# Patient Record
Sex: MALE | Race: WHITE | Employment: UNEMPLOYED | URBAN - NONMETROPOLITAN AREA
[De-identification: names, ages, dates, MRNs, and addresses within clinical notes are randomized per-mention and may not be internally consistent; named-entity substitution may affect disease eponyms.]

---

## 2020-02-28 ENCOUNTER — OFFICE VISIT (OUTPATIENT)
Dept: FAMILY MEDICINE CLINIC | Age: 35
End: 2020-02-28
Payer: MEDICAID

## 2020-02-28 VITALS
SYSTOLIC BLOOD PRESSURE: 138 MMHG | HEIGHT: 72 IN | BODY MASS INDEX: 27.63 KG/M2 | WEIGHT: 204 LBS | HEART RATE: 84 BPM | DIASTOLIC BLOOD PRESSURE: 72 MMHG | TEMPERATURE: 98.6 F | RESPIRATION RATE: 19 BRPM | OXYGEN SATURATION: 99 %

## 2020-02-28 DIAGNOSIS — Z11.4 SCREENING FOR HIV (HUMAN IMMUNODEFICIENCY VIRUS): ICD-10-CM

## 2020-02-28 DIAGNOSIS — Z11.3 SCREENING EXAMINATION FOR STD (SEXUALLY TRANSMITTED DISEASE): ICD-10-CM

## 2020-02-28 DIAGNOSIS — F19.91 HX OF INTRAVENOUS DRUG USE, IN REMISSION: ICD-10-CM

## 2020-02-28 DIAGNOSIS — E66.3 OVERWEIGHT (BMI 25.0-29.9): ICD-10-CM

## 2020-02-28 PROBLEM — F17.200 TOBACCO DEPENDENCE: Status: ACTIVE | Noted: 2020-02-28

## 2020-02-28 LAB
ALBUMIN SERPL-MCNC: 5 G/DL (ref 3.5–5.2)
ALP BLD-CCNC: 64 U/L (ref 40–130)
ALT SERPL-CCNC: 38 U/L (ref 5–41)
ANION GAP SERPL CALCULATED.3IONS-SCNC: 12 MMOL/L (ref 7–19)
AST SERPL-CCNC: 30 U/L (ref 5–40)
BASOPHILS ABSOLUTE: 0.1 K/UL (ref 0–0.2)
BASOPHILS RELATIVE PERCENT: 0.9 % (ref 0–1)
BILIRUB SERPL-MCNC: 0.5 MG/DL (ref 0.2–1.2)
BILIRUBIN URINE: NEGATIVE
BLOOD, URINE: NEGATIVE
BUN BLDV-MCNC: 20 MG/DL (ref 6–20)
CALCIUM SERPL-MCNC: 9.6 MG/DL (ref 8.6–10)
CHLORIDE BLD-SCNC: 102 MMOL/L (ref 98–111)
CHOLESTEROL, TOTAL: 147 MG/DL (ref 160–199)
CLARITY: CLEAR
CO2: 28 MMOL/L (ref 22–29)
COLOR: YELLOW
CREAT SERPL-MCNC: 0.8 MG/DL (ref 0.5–1.2)
EOSINOPHILS ABSOLUTE: 0.1 K/UL (ref 0–0.6)
EOSINOPHILS RELATIVE PERCENT: 2 % (ref 0–5)
GFR NON-AFRICAN AMERICAN: >60
GLUCOSE BLD-MCNC: 82 MG/DL (ref 74–109)
GLUCOSE URINE: NEGATIVE MG/DL
HAV IGM SER IA-ACNC: ABNORMAL
HCT VFR BLD CALC: 49.8 % (ref 42–52)
HDLC SERPL-MCNC: 52 MG/DL (ref 55–121)
HEMOGLOBIN: 16.1 G/DL (ref 14–18)
HEPATITIS B CORE IGM ANTIBODY: ABNORMAL
HEPATITIS B SURFACE ANTIGEN INTERPRETATION: ABNORMAL
HEPATITIS C ANTIBODY INTERPRETATION: REACTIVE
HIV-1 P24 AG: NORMAL
IMMATURE GRANULOCYTES #: 0 K/UL
KETONES, URINE: NEGATIVE MG/DL
LDL CHOLESTEROL CALCULATED: 83 MG/DL
LEUKOCYTE ESTERASE, URINE: NEGATIVE
LYMPHOCYTES ABSOLUTE: 1.9 K/UL (ref 1.1–4.5)
LYMPHOCYTES RELATIVE PERCENT: 32 % (ref 20–40)
MCH RBC QN AUTO: 30.8 PG (ref 27–31)
MCHC RBC AUTO-ENTMCNC: 32.3 G/DL (ref 33–37)
MCV RBC AUTO: 95.2 FL (ref 80–94)
MONOCYTES ABSOLUTE: 0.5 K/UL (ref 0–0.9)
MONOCYTES RELATIVE PERCENT: 9.2 % (ref 0–10)
NEUTROPHILS ABSOLUTE: 3.3 K/UL (ref 1.5–7.5)
NEUTROPHILS RELATIVE PERCENT: 55.6 % (ref 50–65)
NITRITE, URINE: NEGATIVE
PDW BLD-RTO: 13.2 % (ref 11.5–14.5)
PH UA: 7 (ref 5–8)
PLATELET # BLD: 314 K/UL (ref 130–400)
PMV BLD AUTO: 9.6 FL (ref 9.4–12.4)
POTASSIUM SERPL-SCNC: 4.7 MMOL/L (ref 3.5–5)
PROTEIN UA: NEGATIVE MG/DL
RAPID HIV 1&2: NORMAL
RBC # BLD: 5.23 M/UL (ref 4.7–6.1)
SODIUM BLD-SCNC: 142 MMOL/L (ref 136–145)
SPECIFIC GRAVITY UA: 1.01 (ref 1–1.03)
TOTAL PROTEIN: 7.5 G/DL (ref 6.6–8.7)
TRIGL SERPL-MCNC: 58 MG/DL (ref 0–149)
URINE REFLEX TO CULTURE: NORMAL
UROBILINOGEN, URINE: 0.2 E.U./DL
WBC # BLD: 5.9 K/UL (ref 4.8–10.8)

## 2020-02-28 PROCEDURE — 99204 OFFICE O/P NEW MOD 45 MIN: CPT | Performed by: FAMILY MEDICINE

## 2020-02-28 SDOH — HEALTH STABILITY: MENTAL HEALTH: HOW OFTEN DO YOU HAVE A DRINK CONTAINING ALCOHOL?: NEVER

## 2020-02-28 ASSESSMENT — ENCOUNTER SYMPTOMS
EYE ITCHING: 0
APNEA: 0
VOMITING: 0
BACK PAIN: 0
EYE DISCHARGE: 0
NAUSEA: 0
COLOR CHANGE: 0
STRIDOR: 0
FACIAL SWELLING: 0

## 2020-02-28 ASSESSMENT — PATIENT HEALTH QUESTIONNAIRE - PHQ9
1. LITTLE INTEREST OR PLEASURE IN DOING THINGS: 0
SUM OF ALL RESPONSES TO PHQ QUESTIONS 1-9: 0
2. FEELING DOWN, DEPRESSED OR HOPELESS: 0
SUM OF ALL RESPONSES TO PHQ9 QUESTIONS 1 & 2: 0
SUM OF ALL RESPONSES TO PHQ QUESTIONS 1-9: 0

## 2020-02-28 NOTE — PATIENT INSTRUCTIONS
Patient Education        Safer Sex: Care Instructions  Your Care Instructions  Safer sex is a way to reduce your risk of getting an infection spread through sex. It can also help prevent pregnancy. Most infections that are spread through sex, also called sexually transmitted infections or STIs, can be cured. But some can decrease your chances of getting pregnant if they are not treated early. Others, such as herpes, have no cure. And some, such as HIV, can be deadly. Several products can help you practice safer sex and reduce your chance of STIs. One of the best is a condom. There are condoms for men and for women. The female condom is a tube of soft plastic with a closed end that is placed deep into the vagina. You can use a special rubber sheet (dental dam) for protection during oral sex. Disposable gloves can keep your hands from touching blood, semen, or other body fluids that can carry infections. Remember that birth control methods such as diaphragms, IUDs, foams, and birth control pills do not stop you from getting STIs. Follow-up care is a key part of your treatment and safety. Be sure to make and go to all appointments, and call your doctor if you are having problems. It's also a good idea to know your test results and keep a list of the medicines you take. How can you care for yourself at home? · Think about getting shots to prevent hepatitis A and hepatitis B. These two diseases can be spread through sex. You also can get hepatitis A if you eat infected food. · Use condoms or female condoms each time and every time you have sex. · Learn the right way to use a male condom:  ? Condoms come in several sizes. Make sure you use the right size. A condom that is too small can break easily. A condom that is too big can slip off during sex. Use a new condom each time you have sex. ? Be careful not to poke a hole in the condom when you open the wrapper. ?  Squeeze the tip of the condom to keep out air.  ? Pull down the loose skin (foreskin) from the head of an uncircumcised penis. ? While squeezing the tip of the condom, unroll it all the way down to the base of the firm penis. ? Never use petroleum jelly (such as Vaseline), grease, hand lotion, baby oil, or anything with oil in it. These products can make holes in the condom. ? After sex, hold the condom on your penis as you remove your penis from your partner. This will keep semen from spilling out of the condom. · Learn to use a female condom:  ? You can put in a female condom up to 8 hours before sex. ? Squeeze the smaller ring at the closed end and insert it deep into the vagina. The larger ring at the open end should stay outside the vagina. ? During sex, make sure the penis goes into the condom. ? After the penis is removed, close the open end of the condom by twisting it. Remove the condom. · Do not use a female condom and male condom at the same time. · Do not have sex with anyone who has symptoms of an STI, such as sores on the genitals or mouth. The herpes virus that causes cold sores can spread to and from the penis and vagina. · Do not drink a lot of alcohol or use drugs before sex. This can cause you to let down your guard and not practice safer sex. · Having one sex partner (who does not have STIs and does not have sex with anyone else) is a sure way to avoid STIs. · Talk to your partner before you have sex. Find out if he or she has or is at risk for any STI. Keep in mind that a person may be able to spread an STI even if he or she does not have symptoms. You and your partner may want to get an HIV test. You should get tested again 6 months later. Where can you learn more? Go to https://Breadtripbillieeb[facility]. org and sign in to your [employer] account. Enter H897 in the [employer] box to learn more about \"Safer Sex: Care Instructions. \"     If you do not have an account, please click on the \"Sign Up Now\" link.  Current as of: July 7, 2019  Content Version: 12.3  © 8553-7873 [employer]. Care instructions adapted under license by [facility] ([facility]). If you have questions about a medical condition or this instruction, always ask your healthcare professional. [address] any warranty or liability for your use of this information.

## 2020-02-28 NOTE — PROGRESS NOTES
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 \"    Health Maintenance   Topic Date Due    HIV screen  12/30/2002    Varicella vaccine (1 of 2 - 2-dose childhood series) 03/20/2020 (Originally 12/30/1988)    Pneumococcal 0-64 years Vaccine (1 of 1 - PPSV23) 02/27/2021 (Originally 12/30/1993)    DTaP/Tdap/Td vaccine (1 - Tdap) 02/28/2021 (Originally 12/30/1998)    Flu vaccine (1) 02/28/2021 (Originally 9/1/2019)    Shingles Vaccine (1 of 2) 12/30/2037    Hepatitis A vaccine  Aged Out    Hepatitis B vaccine  Aged Out    Hib vaccine  Aged Out    Meningococcal (ACWY) vaccine  Aged Out     As above. Declined rest.    Return in about 3 months (around 5/28/2020) for smoking cessation, lab results. Discussed use, benefit, and side effects of prescribed medications. All patient questions answered. Pt voiced understanding. Reviewed health maintenance. Instructed to continue current medications, diet and exercise. Patient agreedwith treatment plan. Follow up as directed. Old records reviewed, where available.     Prabhu Marshall MD    Note:  dictated using Dragon software

## 2020-03-02 LAB
APTIMA MEDIA TYPE: NORMAL
CHLAMYDIA TRACHOMATIS AMPLIFIED DET: NEGATIVE
N GONORRHOEAE AMPLIFIED DET: NEGATIVE
RPR: NORMAL
SPECIMEN SOURCE: NORMAL

## 2020-03-03 ENCOUNTER — TELEPHONE (OUTPATIENT)
Dept: FAMILY MEDICINE CLINIC | Age: 35
End: 2020-03-03

## 2020-03-03 PROBLEM — B18.2 CHRONIC HEPATITIS C WITHOUT HEPATIC COMA (HCC): Status: ACTIVE | Noted: 2020-03-03

## 2020-03-03 LAB
HCV QNT BY NAAT IU/ML: ABNORMAL IU/ML
HCV QNT BY NAAT LOG IU/ML: 4.83 LOG IU/ML
HERPES TYPE 1/2 IGM COMBINED: 1.53 IV
HERPES TYPE I/II IGG COMBINED: >22.4 IV
HSV 1 GLYCOPROTEIN G AB IGG: 0.29 IV
HSV 2 GLYCOPROTEIN G AB IGG: 8.19 IV
INTERPRETATION: DETECTED

## 2020-06-03 ENCOUNTER — VIRTUAL VISIT (OUTPATIENT)
Dept: FAMILY MEDICINE CLINIC | Age: 35
End: 2020-06-03
Payer: MEDICAID

## 2020-06-03 PROBLEM — F41.1 GAD (GENERALIZED ANXIETY DISORDER): Status: ACTIVE | Noted: 2020-06-03

## 2020-06-03 PROBLEM — R03.0 ELEVATED BLOOD PRESSURE READING WITHOUT DIAGNOSIS OF HYPERTENSION: Status: ACTIVE | Noted: 2020-06-03

## 2020-06-03 PROBLEM — G47.00 INSOMNIA: Status: ACTIVE | Noted: 2020-06-03

## 2020-06-03 PROCEDURE — 99406 BEHAV CHNG SMOKING 3-10 MIN: CPT | Performed by: FAMILY MEDICINE

## 2020-06-03 PROCEDURE — 99213 OFFICE O/P EST LOW 20 MIN: CPT | Performed by: FAMILY MEDICINE

## 2020-06-03 RX ORDER — ARIPIPRAZOLE 5 MG/1
5 TABLET ORAL NIGHTLY PRN
COMMUNITY
Start: 2020-06-01

## 2020-06-03 RX ORDER — MIRTAZAPINE 30 MG/1
30 TABLET, FILM COATED ORAL NIGHTLY PRN
COMMUNITY
Start: 2020-06-01

## 2020-06-03 RX ORDER — NICOTINE 21 MG/24HR
1 PATCH, TRANSDERMAL 24 HOURS TRANSDERMAL EVERY 24 HOURS
Qty: 30 PATCH | Refills: 1 | Status: SHIPPED | OUTPATIENT
Start: 2020-06-03